# Patient Record
Sex: MALE | Race: WHITE
[De-identification: names, ages, dates, MRNs, and addresses within clinical notes are randomized per-mention and may not be internally consistent; named-entity substitution may affect disease eponyms.]

---

## 2020-01-21 ENCOUNTER — HOSPITAL ENCOUNTER (OUTPATIENT)
Dept: HOSPITAL 39 - MRI | Age: 40
End: 2020-01-21
Attending: ORTHOPAEDIC SURGERY
Payer: COMMERCIAL

## 2020-01-21 DIAGNOSIS — M19.011: ICD-10-CM

## 2020-01-21 DIAGNOSIS — S43.004D: Primary | ICD-10-CM

## 2020-01-21 DIAGNOSIS — Z98.890: ICD-10-CM

## 2020-01-21 DIAGNOSIS — M75.91: ICD-10-CM

## 2020-01-21 DIAGNOSIS — S46.011D: ICD-10-CM

## 2020-01-22 NOTE — MRI
Study: MRI of the Right Shoulder. 



Indication: UNSPECIFIED DISLOCATION OF RIGHT SHOULDER JOINT



Technique: Multiplanar, multi sequence MRI of the right shoulder

was obtained without intravenous contrast. 



Comparison: None.



Findings:



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 Type II acromion with

moderate lateral downsloping. Trace subacromial/subdeltoid bursal

fluid.



Supraspinatus and infraspinatus tendinosis and low-grade

attenuation. At the anterior two thirds infraspinatus tendon

insertion there is subtle low to intermediate grade articular

tearing. Involved area measures 18 mm AP by approximately 10 mm

transverse and involved 40% of expected tendon thickness. No

full-thickness tear.



Significant susceptibility artifact at the region of the

subscapularis tendon and anterior capsular structures from prior

surgical repair. Subscapularis tendinosis without appreciable

rupture. Teres minor tendon intact. Minimal atrophy and grade 1

fatty infiltration subscapularis muscle belly. Intracapsular long

head biceps tendinosis.



Humeral head normally located without features of a Hill-Sachs

fracture.



Circumferential labral truncation noted and most pronounced

posteriorly. No full-thickness labral tear. No acute fracture or

advanced glenohumeral joint osteoarthritis. Mild thickening

inferior glenohumeral ligament which can be seen with adhesive

capsulitis.



Impression:



Supraspinatus and infraspinatus tendinosis and low-grade

attenuation with low to intermediate grade articular tearing

throughout the infraspinatus tendon insertion.



Significant susceptibility artifact at the subscapularis tendon

and anterior joint capsule, from surgical repair. Tendinosis of

the subscapularis tendon noted but without appreciable tear. CT

arthrography could better evaluate as clinically indicated.



Circumferential labral truncation without full-thickness tear.

Changes most pronounced posteriorly.



Humeral head normally located. If there is concern for pathology

of the anterior or posterior capsular structures from a prior

dislocation event, correlation with CT arthrography recommended

given the susceptibility artifact on the current exam.



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



Electronically signed by:  Corby Adams MD  1/22/2020 7:43 AM [facility]

Workstation: [id number]